# Patient Record
Sex: FEMALE | Race: WHITE | NOT HISPANIC OR LATINO | Employment: OTHER | URBAN - METROPOLITAN AREA
[De-identification: names, ages, dates, MRNs, and addresses within clinical notes are randomized per-mention and may not be internally consistent; named-entity substitution may affect disease eponyms.]

---

## 2019-10-21 ENCOUNTER — APPOINTMENT (OUTPATIENT)
Dept: WOMENS IMAGING | Facility: HOSPITAL | Age: 64
End: 2019-10-21

## 2019-10-21 PROCEDURE — 77067 SCR MAMMO BI INCL CAD: CPT | Performed by: RADIOLOGY

## 2019-10-21 PROCEDURE — 77063 BREAST TOMOSYNTHESIS BI: CPT | Performed by: RADIOLOGY

## 2022-03-14 ENCOUNTER — OFFICE VISIT (OUTPATIENT)
Dept: CARDIOLOGY | Facility: CLINIC | Age: 67
End: 2022-03-14

## 2022-03-14 VITALS
HEART RATE: 76 BPM | HEIGHT: 66 IN | SYSTOLIC BLOOD PRESSURE: 126 MMHG | DIASTOLIC BLOOD PRESSURE: 72 MMHG | WEIGHT: 120.8 LBS | BODY MASS INDEX: 19.41 KG/M2

## 2022-03-14 DIAGNOSIS — J44.9 COPD WITH ASTHMA: ICD-10-CM

## 2022-03-14 DIAGNOSIS — I25.6 SILENT MYOCARDIAL ISCHEMIA: ICD-10-CM

## 2022-03-14 DIAGNOSIS — F17.200 SMOKING: ICD-10-CM

## 2022-03-14 DIAGNOSIS — R06.02 SHORTNESS OF BREATH: ICD-10-CM

## 2022-03-14 DIAGNOSIS — R94.31 ABNORMAL EKG: ICD-10-CM

## 2022-03-14 DIAGNOSIS — R00.2 PALPITATIONS: Primary | ICD-10-CM

## 2022-03-14 PROBLEM — IMO0001 SMOKING: Status: ACTIVE | Noted: 2022-03-14

## 2022-03-14 PROBLEM — J44.89 COPD WITH ASTHMA: Status: ACTIVE | Noted: 2022-03-14

## 2022-03-14 PROCEDURE — 99204 OFFICE O/P NEW MOD 45 MIN: CPT | Performed by: INTERNAL MEDICINE

## 2022-03-14 PROCEDURE — 93000 ELECTROCARDIOGRAM COMPLETE: CPT | Performed by: INTERNAL MEDICINE

## 2022-03-14 RX ORDER — TRIAMTERENE AND HYDROCHLOROTHIAZIDE 75; 50 MG/1; MG/1
1 TABLET ORAL DAILY
COMMUNITY
End: 2022-11-09 | Stop reason: SDUPTHER

## 2022-03-14 RX ORDER — ALBUTEROL SULFATE 90 UG/1
2 AEROSOL, METERED RESPIRATORY (INHALATION) EVERY 6 HOURS PRN
COMMUNITY

## 2022-03-14 RX ORDER — MONTELUKAST SODIUM 10 MG/1
10 TABLET ORAL DAILY
COMMUNITY

## 2022-03-14 RX ORDER — EPINEPHRINE 0.3 MG/.3ML
0.3 INJECTION SUBCUTANEOUS ONCE
COMMUNITY

## 2022-03-14 RX ORDER — DICYCLOMINE HCL 20 MG
20 TABLET ORAL 2 TIMES DAILY PRN
COMMUNITY

## 2022-03-14 RX ORDER — ASPIRIN 81 MG/1
81 TABLET ORAL DAILY
Qty: 30 TABLET | Refills: 6 | Status: SHIPPED | OUTPATIENT
Start: 2022-03-14 | End: 2022-10-24

## 2022-03-14 RX ORDER — BUSPIRONE HYDROCHLORIDE 15 MG/1
15 TABLET ORAL 3 TIMES DAILY
COMMUNITY

## 2022-03-14 RX ORDER — CHOLECALCIFEROL (VITAMIN D3) 1250 MCG
50000 CAPSULE ORAL
COMMUNITY

## 2022-03-14 RX ORDER — SERTRALINE HYDROCHLORIDE 100 MG/1
100 TABLET, FILM COATED ORAL DAILY
COMMUNITY

## 2022-03-14 RX ORDER — NAPROXEN 500 MG/1
500 TABLET ORAL 2 TIMES DAILY WITH MEALS
COMMUNITY

## 2022-03-14 NOTE — PROGRESS NOTES
Chief Complaint   Patient presents with   • Establish Care     Referred per PCP for abnormal EKG .Patient says she saw you around 20+ years ago, will  see if we can get those records.   • Shortness of Breath     Reports she had been using Albuterol inhalers multiple times daily and feels it made her SOA worse, she has since cut back the amount she uses and SOA has improved .   • Palpitations     Reports  she feels palpitations frequently, with excessive inhaler use it was continually . But has improved some since cutting back on inhaler. She still notices them daily        CARDIAC COMPLAINTS  dyspnea and palpitations      Subjective   Krissy Wesley is a 66 y.o. female came in today for her initial cardiac evaluation.  She was seen here almost 20 years ago for chest pain and stress test done at that time showed increased chronotropic response.  She was put on low-dose of beta-blockers.  She apparently took it for few months and decided to quit taking it.  She was seen at your office recently for shortness of breath.  She has been having increasing shortness of breath and she apparently saw pulmonologist who gave her a prescription for albuterol inhalers and told her that she can use it as many times as she can wants.  When she started having increasing shortness of breath she started using the albuterol inhalers almost 8-10 times a day.  During that time she started noticing jittery feeling where she felt the heart was racing and she also noticed that the shortness of breath got worse.  After talking with you she cut down the amount of inhalers and then the symptoms but slowly getting better.  She did have an EKG done at your office visit which was reported as abnormal.  She is now referred for further evaluation.  She denies having any chest pain at this time.  She does continues to have shortness of breath and palpitation does little better than before.  She had some labs done which showed normal blood count,  normal WBC, normal blood sugar and electrolytes.  Her cholesterol is 159 with the LDL of 82 and triglyceride of 114.  Her TSH came back as normal.  She unfortunately still smokes about a pack a day.  She drinks at least 2 pots of coffee a day and also multiple Pepsi.  Her mother  with cancer and she did have hypertension.  Her father  of heart attack.    Past Surgical History:   Procedure Laterality Date   • CARDIOVASCULAR STRESS TEST  02/15/1999    R.Stress- 7 Min. 10.2 METS. 91% THR. BP- 136/92. Negative       Current Outpatient Medications   Medication Sig Dispense Refill   • albuterol sulfate  (90 Base) MCG/ACT inhaler Inhale 2 puffs Every 6 (Six) Hours As Needed for Wheezing.     • busPIRone (BUSPAR) 15 MG tablet Take 15 mg by mouth 3 (Three) Times a Day.     • Cholecalciferol (Vitamin D3) 1.25 MG (97651 UT) capsule Take 50,000 Units by mouth Every 7 (Seven) Days.     • dicyclomine (BENTYL) 20 MG tablet Take 20 mg by mouth 2 (Two) Times a Day As Needed.     • EPINEPHrine (EPIPEN) 0.3 MG/0.3ML solution auto-injector injection 0.3 mg 1 (One) Time.     • FOSINOPRIL SODIUM PO Take 10 mg by mouth Daily.     • montelukast (SINGULAIR) 10 MG tablet Take 10 mg by mouth Daily.     • naproxen (NAPROSYN) 500 MG tablet Take 500 mg by mouth 2 (Two) Times a Day With Meals.     • sertraline (ZOLOFT) 100 MG tablet Take 100 mg by mouth Daily. Takes 2 tablets at HS     • tiotropium (SPIRIVA) 18 MCG per inhalation capsule Place 1 capsule into inhaler and inhale Daily.     • triamterene-hydrochlorothiazide (MAXZIDE) 75-50 MG per tablet Take 1 tablet by mouth Daily.     • aspirin (aspirin) 81 MG EC tablet Take 1 tablet by mouth Daily. 30 tablet 6     No current facility-administered medications for this visit.           ALLERGIES:  Amoxicillin, Azithromycin, Bee venom, and Sulfa antibiotics    Past Medical History:   Diagnosis Date   • Acid reflux    • Asthma    • COPD (chronic obstructive pulmonary disease) ([facility])   "  • Hypertension    • IBS (irritable bowel syndrome)        Social History     Tobacco Use   Smoking Status Current Every Day Smoker   • Packs/day: 1.00   • Years: 40.00   • Pack years: 40.00   • Types: Cigarettes   Smokeless Tobacco Not on file          Family History   Problem Relation Age of Onset   • Hypertension Mother    • Cancer Mother    • Heart attack Father    • Hypertension Father        Review of Systems   Constitutional: Positive for malaise/fatigue. Negative for decreased appetite.   HENT: Negative for congestion and sore throat.    Eyes: Negative for blurred vision.   Cardiovascular: Positive for dyspnea on exertion and palpitations. Negative for chest pain.   Respiratory: Positive for shortness of breath. Negative for snoring.    Endocrine: Negative for cold intolerance and heat intolerance.   Hematologic/Lymphatic: Negative for adenopathy. Does not bruise/bleed easily.   Skin: Negative for itching, nail changes and skin cancer.   Musculoskeletal: Negative for arthritis and myalgias.   Gastrointestinal: Negative for abdominal pain, dysphagia and heartburn.   Genitourinary: Negative for bladder incontinence and frequency.   Neurological: Negative for dizziness, light-headedness, seizures and vertigo.   Psychiatric/Behavioral: Negative for altered mental status.   Allergic/Immunologic: Negative for environmental allergies and hives.       Diabetes- No  Thyroid- normal    Objective     /72 (BP Location: Left arm)   Pulse 76   Ht 167.6 cm (66\")   Wt 54.8 kg (120 lb 12.8 oz)   BMI 19.50 kg/m²     Vitals and nursing note reviewed.   Constitutional:       Appearance: Healthy appearance. Not in distress.   Eyes:      Conjunctiva/sclera: Conjunctivae normal.      Pupils: Pupils are equal, round, and reactive to light.   HENT:      Head: Normocephalic.   Pulmonary:      Effort: Pulmonary effort is normal.      Breath sounds: Normal breath sounds.   Cardiovascular:      PMI at left midclavicular line. " Normal rate. Regular rhythm.      Murmurs: There is a systolic murmur.   Abdominal:      General: Bowel sounds are normal.      Palpations: Abdomen is soft.   Musculoskeletal: Normal range of motion.      Cervical back: Normal range of motion and neck supple. Skin:     General: Skin is warm and dry.   Neurological:      Mental Status: Alert, oriented to person, place, and time and oriented to person, place and time.           ECG 12 Lead    Date/Time: 3/14/2022 1:05 PM  Performed by: Moises Billingsley MD  Authorized by: Moises Billingsley MD   Comparison: compared with previous ECG from 3/1/2022  Similar to previous ECG  Rhythm: sinus rhythm  Rate: normal  Q waves: V1, V2, V3 and V4    QRS axis: normal  Other findings: T wave abnormality    Clinical impression: abnormal EKG              Assessment/Plan   Patient's Body mass index is 19.5 kg/m². indicating that she is within normal range (BMI 18.5-24.9). No BMI management plan needed..     Diagnoses and all orders for this visit:    1. Palpitations (Primary)  -     Stress Test With Myocardial Perfusion One Day; Future  -     Comprehensive Metabolic Panel; Future  -     TSH; Future    2. Shortness of breath  -     Adult Transthoracic Echo Complete W/ Cont if Necessary Per Protocol; Future    3. Abnormal EKG  -     Stress Test With Myocardial Perfusion One Day; Future  -     aspirin (aspirin) 81 MG EC tablet; Take 1 tablet by mouth Daily.  Dispense: 30 tablet; Refill: 6    4. COPD with asthma (HCC)  -     CBC & Differential; Future    5. Smoking    6. Silent myocardial ischemia  -     Stress Test With Myocardial Perfusion One Day; Future  -     Lipid Panel; Future    At baseline her heart rate is stable.  Her blood pressure is normal.  Her EKG showed sinus rhythm with Q waves in the anterior leads and tall T waves in the inferolateral leads.  Her clinical examination reveals a BMI of 20.  She does have a slightly loud second heart sound and a short systolic murmur  at the mitral area.    Regarding the palpitation, it could be from the sinus tachycardia but need to rule out other causes also.  Her thyroid function test seems to be within normal limits last year.  Need to recheck it to make sure it is within normal limit.  I also advised her to check her electrolytes including magnesium level.  I scheduled her to undergo a stress test to evaluate her functional status, chronotropic response, blood pressure response and also rule out any stress-induced arrhythmia as well as ischemia causing some of the symptoms.  Regarding the shortness of breath, it is most likely from her smoking and asthma    But need to rule out cardiac.  I scheduled her to undergo an echocardiogram to evaluate for LVH, LV function, valvular structures as well as the PA pressure    Regarding the abnormal EKG, she does have some Q waves and abnormal T waves.  This all could be from the increased caffeine intake but I cannot rule out cardiac.  At this time advised her to be on aspirin at 81 mg once a day    Regarding her COPD and smoking, I talked to her about smoking cessation.  I explained to her the increased risk but at this time I do not think she is planning to quit smoking.    Regarding her caffeine intake, I talked to her about cutting down on the amount of caffeine she takes now    Regarding the possibility of silent ischemia was discussed with her at this time will review the stress test for any ischemia and also advised her to recheck her lipid panel    Based on the results, further recommendations will be made.               Electronically signed by Moises Billingsley MD March 14, 2022 12:54 EDT

## 2022-03-14 NOTE — PROGRESS NOTES
Krissy Wesley  reports that she has been smoking cigarettes. She has a 40.00 pack-year smoking history. She does not have any smokeless tobacco history on file.. I have educated her on the risk of diseases from using tobacco products such as cancer, COPD and heart disease.     I advised her to quit and she is not willing to quit.    I spent 3  minutes counseling the patient.

## 2022-04-06 ENCOUNTER — HOSPITAL ENCOUNTER (OUTPATIENT)
Dept: CARDIOLOGY | Facility: HOSPITAL | Age: 67
Discharge: HOME | End: 2022-04-06

## 2022-04-06 DIAGNOSIS — I25.6 SILENT MYOCARDIAL ISCHEMIA: ICD-10-CM

## 2022-04-06 DIAGNOSIS — R94.31 ABNORMAL EKG: ICD-10-CM

## 2022-04-06 DIAGNOSIS — R06.02 SHORTNESS OF BREATH: ICD-10-CM

## 2022-04-06 DIAGNOSIS — R00.2 PALPITATIONS: ICD-10-CM

## 2022-04-06 PROCEDURE — A9500 TC99M SESTAMIBI: HCPCS | Performed by: INTERNAL MEDICINE

## 2022-04-06 PROCEDURE — 93017 CV STRESS TEST TRACING ONLY: CPT

## 2022-04-06 PROCEDURE — 93018 CV STRESS TEST I&R ONLY: CPT | Performed by: INTERNAL MEDICINE

## 2022-04-06 PROCEDURE — 0 TECHNETIUM SESTAMIBI: Performed by: INTERNAL MEDICINE

## 2022-04-06 PROCEDURE — 93306 TTE W/DOPPLER COMPLETE: CPT | Performed by: INTERNAL MEDICINE

## 2022-04-06 PROCEDURE — 78452 HT MUSCLE IMAGE SPECT MULT: CPT | Performed by: INTERNAL MEDICINE

## 2022-04-06 PROCEDURE — 93306 TTE W/DOPPLER COMPLETE: CPT

## 2022-04-06 PROCEDURE — 78452 HT MUSCLE IMAGE SPECT MULT: CPT

## 2022-04-06 RX ADMIN — TECHNETIUM TC 99M SESTAMIBI 1 DOSE: 1 INJECTION INTRAVENOUS at 08:41

## 2022-04-06 RX ADMIN — TECHNETIUM TC 99M SESTAMIBI 1 DOSE: 1 INJECTION INTRAVENOUS at 11:05

## 2022-04-09 LAB
BH CV ECHO MEAS - ACS: 2.17 CM
BH CV ECHO MEAS - AO MAX PG: 7.7 MMHG
BH CV ECHO MEAS - AO MEAN PG: 4 MMHG
BH CV ECHO MEAS - AO ROOT DIAM: 3.1 CM
BH CV ECHO MEAS - AO V2 MAX: 138.5 CM/SEC
BH CV ECHO MEAS - AO V2 VTI: 31.7 CM
BH CV ECHO MEAS - EDV(CUBED): 54.9 ML
BH CV ECHO MEAS - EDV(MOD-SP4): 50.4 ML
BH CV ECHO MEAS - EF(MOD-SP4): 53.8 %
BH CV ECHO MEAS - EF_3D-VOL: 59 %
BH CV ECHO MEAS - ESV(CUBED): 21.3 ML
BH CV ECHO MEAS - ESV(MOD-SP4): 23.3 ML
BH CV ECHO MEAS - FS: 27.1 %
BH CV ECHO MEAS - IVS/LVPW: 0.8 CM
BH CV ECHO MEAS - IVSD: 1.05 CM
BH CV ECHO MEAS - LA DIMENSION: 3.3 CM
BH CV ECHO MEAS - LAT PEAK E' VEL: 7.3 CM/SEC
BH CV ECHO MEAS - LV DIASTOLIC VOL/BSA (35-75): 31.3 CM2
BH CV ECHO MEAS - LV MASS(C)D: 150.4 GRAMS
BH CV ECHO MEAS - LV SYSTOLIC VOL/BSA (12-30): 14.5 CM2
BH CV ECHO MEAS - LVIDD: 3.8 CM
BH CV ECHO MEAS - LVIDS: 2.8 CM
BH CV ECHO MEAS - LVOT AREA: 3 CM2
BH CV ECHO MEAS - LVOT DIAM: 1.96 CM
BH CV ECHO MEAS - LVPWD: 1.32 CM
BH CV ECHO MEAS - MED PEAK E' VEL: 5.8 CM/SEC
BH CV ECHO MEAS - MV A MAX VEL: 85.3 CM/SEC
BH CV ECHO MEAS - MV DEC SLOPE: 433.4 CM/SEC2
BH CV ECHO MEAS - MV E MAX VEL: 80.1 CM/SEC
BH CV ECHO MEAS - MV E/A: 0.94
BH CV ECHO MEAS - RVDD: 2.6 CM
BH CV ECHO MEAS - SI(MOD-SP4): 16.8 ML/M2
BH CV ECHO MEAS - SV(MOD-SP4): 27.1 ML
BH CV ECHO MEASUREMENTS AVERAGE E/E' RATIO: 12.23
BH CV REST NUCLEAR ISOTOPE DOSE: 10 MCI
BH CV STRESS NUCLEAR ISOTOPE DOSE: 30 MCI
BH CV STRESS RECOVERY BP: NORMAL MMHG
BH CV STRESS RECOVERY HR: 87 BPM
LEFT ATRIUM VOLUME INDEX: 31.9 ML/M2
LV EF NUC BP: 81 %
MAXIMAL PREDICTED HEART RATE: 156 BPM
MAXIMAL PREDICTED HEART RATE: 156 BPM
PERCENT MAX PREDICTED HR: 75 %
STRESS BASELINE BP: NORMAL MMHG
STRESS BASELINE HR: 80 BPM
STRESS PERCENT HR: 88 %
STRESS POST ESTIMATED WORKLOAD: 4.6 METS
STRESS POST EXERCISE DUR MIN: 3 MIN
STRESS POST EXERCISE DUR SEC: 49 SEC
STRESS POST PEAK BP: NORMAL MMHG
STRESS POST PEAK HR: 117 BPM
STRESS TARGET HR: 133 BPM
STRESS TARGET HR: 133 BPM

## 2022-04-12 NOTE — PROGRESS NOTES
Pt aware of results and recommendations to add Cozaar 50 mg daily. Advised to continue to monitor vitals and call with any problems. She said her PCP had acutally decreased on of her BP meds ( possibly the fosinopril to 1/2 tab daily) a couple weeks ago due to her BP being low.  Since then her BP has been elevated. She is going to add the Cozaar and see how it does. Advised to call if she has any problems.

## 2022-04-12 NOTE — TELEPHONE ENCOUNTER
Pt aware there is no need to change to the Cozaar since PCP had decreased the fosinopril , we will just  increase the fosinopril to 20 mg daily. Understanding voiced. She would like script sent to [facility]. Aware to continue to monitor vitals and call with any problems.

## 2022-04-13 RX ORDER — FOSINOPRIL SODIUM 20 MG/1
20 TABLET ORAL DAILY
Qty: 90 TABLET | Refills: 3 | Status: SHIPPED | OUTPATIENT
Start: 2022-04-13 | End: 2022-05-25

## 2022-04-14 ENCOUNTER — APPOINTMENT (OUTPATIENT)
Dept: CARDIOLOGY | Facility: HOSPITAL | Age: 67
End: 2022-04-14

## 2022-04-25 ENCOUNTER — TELEPHONE (OUTPATIENT)
Dept: CARDIOLOGY | Facility: CLINIC | Age: 67
End: 2022-04-25

## 2022-04-25 RX ORDER — METOPROLOL SUCCINATE 50 MG/1
50 TABLET, EXTENDED RELEASE ORAL EVERY MORNING
Qty: 90 TABLET | Refills: 3 | Status: SHIPPED | OUTPATIENT
Start: 2022-04-25 | End: 2022-06-07 | Stop reason: ALTCHOICE

## 2022-04-25 NOTE — TELEPHONE ENCOUNTER
Patient aware to decrease fosinopril to 10 mg daily and add Toprol XL 50 mg every morning.  Script sent to [facility].  Patient will monitor vitals and call office if not improving.

## 2022-04-25 NOTE — TELEPHONE ENCOUNTER
Patient called, she is concerned her heart rate is running too fast.  The last few days BP when she wakes up before taking fosinopril 20 mg QAM.  /89 HR 84  /77 HR 84  /91 HR 79  Then she takes her vitals late afternoon.  /66   /63   /75 HR 99  She does report palpitations. She feels heart pounding most of day.    She only takes her Maxzide PRN.  3/17/2022 TSH normal.

## 2022-05-19 NOTE — TELEPHONE ENCOUNTER
Pt called, BP has been elevated. Spoke at length with patient. BP had been elevated:    /99 pulse 67        164/90           68  Reports only rarely taking the Maxzide due to it dropping the BP to much. She is going to try taking fosinopril 20 mg 1/2 tab daily, metoprolol succ 50 mg daily and 1/2 tab of the Maxzide 75/50 mg daily to see if BP is better controlled. Will call with progress report.

## 2022-05-25 RX ORDER — LISINOPRIL 5 MG/1
5 TABLET ORAL DAILY
Qty: 90 TABLET | Refills: 3 | Status: SHIPPED | OUTPATIENT
Start: 2022-05-25 | End: 2022-11-09 | Stop reason: SDUPTHER

## 2022-05-25 NOTE — TELEPHONE ENCOUNTER
Pt aware to decrease Lisinopril to 5 mg daily, continue to monitor vitals and call if still having problems.

## 2022-05-25 NOTE — TELEPHONE ENCOUNTER
"Pt called back and left message with BP and pulse readings  20th    157/92 p 77      21st    150/86 p 71     22nd   112/69 p 89    23rd     99/47 p 86    24th     91/51 p  83      Current meds include:    Fosinopril 20 mg 1/2 tab daily  Metoprolol succ 50 mg daily  Maxzide 75/50 mg 1/2 tab daily    Pt does not want to stop the Maxzide. Reports she has taken for years and she has edema if she doesn't take it. \" I used to take 10 mg of the fosinopril and the maxzide and it controlled it for years, I just don't know what has happened.\"   "

## 2022-05-26 ENCOUNTER — TELEPHONE (OUTPATIENT)
Dept: CARDIOLOGY | Facility: CLINIC | Age: 67
End: 2022-05-26

## 2022-05-26 NOTE — TELEPHONE ENCOUNTER
Patient called to clarify that she was supposed to change from fosinopril to lisinopril 5 mg daily. Patient was made aware per Dr. Billingsley that she was to change.

## 2022-06-07 ENCOUNTER — TELEPHONE (OUTPATIENT)
Dept: CARDIOLOGY | Facility: CLINIC | Age: 67
End: 2022-06-07

## 2022-06-07 RX ORDER — DILTIAZEM HYDROCHLORIDE 180 MG/1
180 CAPSULE, COATED, EXTENDED RELEASE ORAL DAILY
Qty: 90 CAPSULE | Refills: 3 | Status: SHIPPED | OUTPATIENT
Start: 2022-06-07 | End: 2022-11-09 | Stop reason: SDUPTHER

## 2022-06-07 NOTE — TELEPHONE ENCOUNTER
Patient aware of recommendations to stop Toprol and start  Cardizem  mg daily.  Script sent to [facility].

## 2022-06-07 NOTE — TELEPHONE ENCOUNTER
Patient reports she is having a lot of SOA since starting the Toprol XL 50 mg daily.  She states even walking from room to room she develops SOA.   She has asthma and COPD and she feels like the Toprol has made her breathing much worse.   She tried to decrease the Toprol to 1/2 tab yesterday and HR went to 103.  She said BP is doing good. 114/67, 130/74, 137/84, 107/67    Also on:  Lisinopril 5 mg daily  Maxzide 75/50 mg 1/2 tab daily

## 2022-06-17 ENCOUNTER — TELEPHONE (OUTPATIENT)
Dept: CARDIOLOGY | Facility: CLINIC | Age: 67
End: 2022-06-17

## 2022-06-17 NOTE — TELEPHONE ENCOUNTER
Patient called stating that since starting diltiazem  mg daily her shortness of breath is better, but HR has been running around 92-97. She states that BP has been running around 139/85, 147/89, this morning it was 133/83 HR 82. Patient states that she stopped taking Maxzide 75-50 mg daily this week because she wasn't sure if she needed to take together. Patient is still also taking lisinopril. She is asking what recommendations you have?

## 2022-06-20 NOTE — TELEPHONE ENCOUNTER
Pt made aware, voiced understanding.  She has only been drinking Coffee and Pepsi.  Pt is going to drink only water over the new few days also and see what her HR does with that.

## 2022-10-24 DIAGNOSIS — R94.31 ABNORMAL EKG: ICD-10-CM

## 2022-10-24 RX ORDER — ASPIRIN 81 MG/1
TABLET ORAL
Qty: 30 TABLET | Refills: 6 | Status: SHIPPED | OUTPATIENT
Start: 2022-10-24

## 2022-11-09 ENCOUNTER — OFFICE VISIT (OUTPATIENT)
Dept: CARDIOLOGY | Facility: CLINIC | Age: 67
End: 2022-11-09

## 2022-11-09 VITALS
HEIGHT: 66 IN | HEART RATE: 82 BPM | WEIGHT: 126.2 LBS | SYSTOLIC BLOOD PRESSURE: 126 MMHG | DIASTOLIC BLOOD PRESSURE: 84 MMHG | BODY MASS INDEX: 20.28 KG/M2

## 2022-11-09 DIAGNOSIS — R06.02 SHORTNESS OF BREATH: ICD-10-CM

## 2022-11-09 DIAGNOSIS — R00.2 PALPITATIONS: Primary | ICD-10-CM

## 2022-11-09 DIAGNOSIS — I10 PRIMARY HYPERTENSION: ICD-10-CM

## 2022-11-09 PROCEDURE — 99213 OFFICE O/P EST LOW 20 MIN: CPT | Performed by: NURSE PRACTITIONER

## 2022-11-09 RX ORDER — LISINOPRIL 5 MG/1
5 TABLET ORAL DAILY
Qty: 90 TABLET | Refills: 3 | Status: SHIPPED | OUTPATIENT
Start: 2022-11-09

## 2022-11-09 RX ORDER — DILTIAZEM HYDROCHLORIDE 180 MG/1
180 CAPSULE, COATED, EXTENDED RELEASE ORAL DAILY
Qty: 90 CAPSULE | Refills: 3 | Status: SHIPPED | OUTPATIENT
Start: 2022-11-09

## 2022-11-09 RX ORDER — TRIAMTERENE AND HYDROCHLOROTHIAZIDE 75; 50 MG/1; MG/1
1 TABLET ORAL DAILY
Qty: 90 TABLET | Refills: 2 | Status: SHIPPED | OUTPATIENT
Start: 2022-11-09

## 2022-11-09 NOTE — PROGRESS NOTES
Chief Complaint   Patient presents with   • Follow-up     Pt is here for cardiac follow up.  Pt states she does have SOB, but relates it to COPD and home renovations.  She denies CP, dizziness or palpitations.  Pt does take a daily ASA.   • Med Refill     Pt request 90 day refills to be sent to [facility].  Medications were verbalized by the pt.     • Lab Work     Pt states their last labs were about 8 months ago with her PCP.         Cardiac Complaints  dyspnea      Subjective   Krissy Wesley is a 67 y.o. female with palpitations, HTN, tobacco abuse, and anxiety. She was referred back to our office in 2022 after not being seen for 20 years for SOA and palpitations. She was urged to undergo cardiac workup to rule out cardiac concerns. Stress was negative for ischemia, but HTN response noted, cozaar added, later changed to lisinopril. Echo showed EF stable with mild MR.  She had called in with tachycardia, BB added, later changed to cardizem due to fatigue.    She comes today for follow up and admits to continued SOA, which she relates to COPD and tobacco abuse. No CP, dizziness, palpitations, or syncope reported. Labs done 8 months ago with PCP, no current available. Refills requested for 90 day supply.      Cardiac History  Past Surgical History:   Procedure Laterality Date   • CARDIOVASCULAR STRESS TEST  02/15/1999    R.Stress- 7 Min. 10.2 METS. 91% THR. BP- 136/92. Negative   • CARDIOVASCULAR STRESS TEST  04/06/2022    3 Min.49 Sec. 4.6 METS. 75% THR. BP- 179/70. EF > 70%. Negative   • ECHO - CONVERTED  04/06/2022    EF 65%. Mild MR & AI       Current Outpatient Medications   Medication Sig Dispense Refill   • albuterol sulfate  (90 Base) MCG/ACT inhaler Inhale 2 puffs Every 6 (Six) Hours As Needed for Wheezing.     • Aspirin Adult Low Strength 81 MG EC tablet TAKE ONE TABLET BY MOUTH DAILY 30 tablet 6   • busPIRone (BUSPAR) 15 MG tablet Take 15 mg by mouth 3 (Three) Times a Day.     • Cholecalciferol  (Vitamin D3) 1.25 MG (62693 UT) capsule Take 50,000 Units by mouth Every 7 (Seven) Days.     • dicyclomine (BENTYL) 20 MG tablet Take 20 mg by mouth 2 (Two) Times a Day As Needed.     • dilTIAZem CD (Cardizem CD) 180 MG 24 hr capsule Take 1 capsule by mouth Daily. 90 capsule 3   • EPINEPHrine (EPIPEN) 0.3 MG/0.3ML solution auto-injector injection 0.3 mg 1 (One) Time.     • lisinopril (PRINIVIL,ZESTRIL) 5 MG tablet Take 1 tablet by mouth Daily. 90 tablet 3   • montelukast (SINGULAIR) 10 MG tablet Take 10 mg by mouth Daily.     • naproxen (NAPROSYN) 500 MG tablet Take 500 mg by mouth 2 (Two) Times a Day With Meals.     • sertraline (ZOLOFT) 100 MG tablet Take 100 mg by mouth Daily. Takes 2 tablets at HS     • tiotropium (SPIRIVA) 18 MCG per inhalation capsule Place 1 capsule into inhaler and inhale Daily.     • triamterene-hydrochlorothiazide (MAXZIDE) 75-50 MG per tablet Take 1 tablet by mouth Daily. 90 tablet 2     No current facility-administered medications for this visit.       Amoxicillin, Azithromycin, Bee venom, and Sulfa antibiotics    Past Medical History:   Diagnosis Date   • Acid reflux    • Asthma    • COPD (chronic obstructive pulmonary disease) (HCC)    • Hypertension    • IBS (irritable bowel syndrome)        Social History     Socioeconomic History   • Marital status: Unknown   Tobacco Use   • Smoking status: Every Day     Packs/day: 1.00     Years: 40.00     Pack years: 40.00     Types: Cigarettes   Vaping Use   • Vaping Use: Never used   Substance and Sexual Activity   • Alcohol use: Not Currently   • Drug use: Never       Family History   Problem Relation Age of Onset   • Hypertension Mother    • Cancer Mother    • Heart attack Father    • Hypertension Father        Review of Systems   Constitutional: Negative for malaise/fatigue and night sweats.   Cardiovascular: Positive for dyspnea on exertion. Negative for chest pain, claudication, irregular heartbeat, leg swelling, near-syncope, orthopnea,  "palpitations and syncope.   Respiratory: Positive for shortness of breath. Negative for cough and wheezing.    Musculoskeletal: Positive for stiffness. Negative for back pain and joint pain.   Gastrointestinal: Negative for anorexia, heartburn, nausea and vomiting.   Genitourinary: Negative for dysuria, hematuria, hesitancy and nocturia.   Neurological: Negative for dizziness, headaches and light-headedness.   Psychiatric/Behavioral: Negative for depression and memory loss. The patient is not nervous/anxious.            Objective     /84 (BP Location: Left arm, Patient Position: Sitting)   Pulse 82   Ht 167.6 cm (66\")   Wt 57.2 kg (126 lb 3.2 oz)   BMI 20.37 kg/m²     Constitutional:       Appearance: Not in distress.   Eyes:      Pupils: Pupils are equal, round, and reactive to light.   HENT:      Nose: Nose normal.   Pulmonary:      Effort: Pulmonary effort is normal.      Breath sounds: Normal breath sounds.   Cardiovascular:      PMI at left midclavicular line. Normal rate. Regular rhythm.      Murmurs: There is a systolic murmur.   Abdominal:      Palpations: Abdomen is soft.   Musculoskeletal: Normal range of motion.      Cervical back: Normal range of motion and neck supple. Skin:     General: Skin is warm and dry.   Neurological:      Mental Status: Alert and oriented to person, place and time.         Procedures         Diagnoses and all orders for this visit:    1. Palpitations (Primary)    2. Primary hypertension    3. Shortness of breath    Other orders  -     dilTIAZem CD (Cardizem CD) 180 MG 24 hr capsule; Take 1 capsule by mouth Daily.  Dispense: 90 capsule; Refill: 3  -     lisinopril (PRINIVIL,ZESTRIL) 5 MG tablet; Take 1 tablet by mouth Daily.  Dispense: 90 tablet; Refill: 3  -     triamterene-hydrochlorothiazide (MAXZIDE) 75-50 MG per tablet; Take 1 tablet by mouth Daily.  Dispense: 90 tablet; Refill: 2             Palpitations: On cardizem therapy. She states it has worked well vs BB " therapy. She was urged to limit caffeine intake and increase fluids.    HTN: BP is stable today. No changes to current lisinopril, maxzide, or cardizem therapy. Limited sodium intake urged.     Cardiac status: Stable today. On ASA without concerns. No bleed or bruise reported. Most recent workup showed no ischemia, LV dysfunction noted.     COPD: On inhaler therapy. She tolerates well. No worsening SOA noted.     Tobacco abuse: Cessation urged, she is not ready to quit at present.     Refills per request.     BMI noted at 20.37, good cardiac diet and walking regimen urged.     6 month follow up advised, or sooner if needed.       Problems Addressed this Visit        Cardiac and Vasculature    Palpitations - Primary       Pulmonary and Pneumonias    Shortness of breath   Other Visit Diagnoses     Primary hypertension        Relevant Medications    dilTIAZem CD (Cardizem CD) 180 MG 24 hr capsule    lisinopril (PRINIVIL,ZESTRIL) 5 MG tablet    triamterene-hydrochlorothiazide (MAXZIDE) 75-50 MG per tablet      Diagnoses       Codes Comments    Palpitations    -  Primary ICD-10-CM: R00.2  ICD-9-CM: 785.1     Primary hypertension     ICD-10-CM: I10  ICD-9-CM: 401.9     Shortness of breath     ICD-10-CM: R06.02  ICD-9-CM: 786.05           BMI is within normal parameters. No other follow-up for BMI required.                Electronically signed by MINO Lagunas November 9, 2022 11:41 EST

## 2022-11-09 NOTE — ACP (ADVANCE CARE PLANNING)
Advance Care Planning   ACP discussion was declined by the patient. Patient does not have an advance directive, declines further assistance.

## 2023-05-09 ENCOUNTER — OFFICE VISIT (OUTPATIENT)
Dept: CARDIOLOGY | Facility: CLINIC | Age: 68
End: 2023-05-09
Payer: MEDICARE

## 2023-05-09 VITALS
WEIGHT: 135.4 LBS | HEART RATE: 70 BPM | DIASTOLIC BLOOD PRESSURE: 84 MMHG | BODY MASS INDEX: 21.76 KG/M2 | HEIGHT: 66 IN | SYSTOLIC BLOOD PRESSURE: 150 MMHG

## 2023-05-09 DIAGNOSIS — R00.2 PALPITATIONS: Primary | ICD-10-CM

## 2023-05-09 DIAGNOSIS — J44.9 COPD WITH ASTHMA: ICD-10-CM

## 2023-05-09 DIAGNOSIS — I10 PRIMARY HYPERTENSION: ICD-10-CM

## 2023-05-09 DIAGNOSIS — Z87.891 FORMER SMOKER: ICD-10-CM

## 2023-05-09 PROCEDURE — 99214 OFFICE O/P EST MOD 30 MIN: CPT | Performed by: NURSE PRACTITIONER

## 2023-05-09 RX ORDER — TRIAMTERENE AND HYDROCHLOROTHIAZIDE 75; 50 MG/1; MG/1
1 TABLET ORAL DAILY
Qty: 90 TABLET | Refills: 2 | Status: SHIPPED | OUTPATIENT
Start: 2023-05-09

## 2023-05-09 RX ORDER — LISINOPRIL 5 MG/1
5 TABLET ORAL DAILY
Qty: 90 TABLET | Refills: 3 | Status: SHIPPED | OUTPATIENT
Start: 2023-05-09

## 2023-05-09 RX ORDER — DILTIAZEM HYDROCHLORIDE 180 MG/1
180 CAPSULE, COATED, EXTENDED RELEASE ORAL DAILY
Qty: 90 CAPSULE | Refills: 3 | Status: SHIPPED | OUTPATIENT
Start: 2023-05-09

## 2023-05-09 NOTE — PROGRESS NOTES
Chief Complaint   Patient presents with   • Follow-up     Pt is here for cardiac follow up.  Pt quit smoking on March 4th and is now exercising daily.  Pt denies CP, SOB, dizziness or palpitations.  Pt does take a daily ASA.   • Med Refill     Pt request 90 day refills to be sent to [facility].  Medications were verified by the pt.     •  Lab Work     Pt states their last labs were about 5 months ago with her PCP.         Cardiac Complaints  none      Subjective   Krissy Wesley is a 67 y.o. female with palpitations, HTN, tobacco abuse, and anxiety. She was referred back to our office in 2022 after not being seen for 20 years for SOA and palpitations. She was urged to undergo cardiac workup to rule out cardiac concerns. Stress was negative for ischemia, but HTN response noted, cozaar added, later changed to lisinopril. Echo showed EF stable with mild MR.  She had called in with tachycardia, BB added, later changed to cardizem due to fatigue.    She comes today for follow up and reports doing very well. She states she has quit smoking since March 4th after she was diagnosed with COPD and couldn't breath. She states her breath is so much better since she quit and oxygen improved. She is doing P90X daily without concerns. She is followed by Dr. Hanson. Labs with PCP, checked 5 months ago, no current available. Refills requested for 90 day supply.            Cardiac History  Past Surgical History:   Procedure Laterality Date   • CARDIOVASCULAR STRESS TEST  02/15/1999    R.Stress- 7 Min. 10.2 METS. 91% THR. BP- 136/92. Negative   • CARDIOVASCULAR STRESS TEST  04/06/2022    3 Min.49 Sec. 4.6 METS. 75% THR. BP- 179/70. EF > 70%. Negative   • ECHO - CONVERTED  04/06/2022    EF 65%. Mild MR & AI       Current Outpatient Medications   Medication Sig Dispense Refill   • albuterol sulfate  (90 Base) MCG/ACT inhaler Inhale 2 puffs Every 6 (Six) Hours As Needed for Wheezing.     • Aspirin Adult Low Strength 81 MG EC  tablet TAKE ONE TABLET BY MOUTH DAILY 30 tablet 6   • busPIRone (BUSPAR) 15 MG tablet Take 1 tablet by mouth 3 (Three) Times a Day.     • dilTIAZem CD (Cardizem CD) 180 MG 24 hr capsule Take 1 capsule by mouth Daily. 90 capsule 3   • EPINEPHrine (EPIPEN) 0.3 MG/0.3ML solution auto-injector injection 0.3 mg 1 (One) Time.     • lisinopril (PRINIVIL,ZESTRIL) 5 MG tablet Take 1 tablet by mouth Daily. 90 tablet 3   • montelukast (SINGULAIR) 10 MG tablet Take 1 tablet by mouth Daily.     • naproxen (NAPROSYN) 500 MG tablet Take 1 tablet by mouth 2 (Two) Times a Day With Meals.     • sertraline (ZOLOFT) 100 MG tablet Take 2 tablets by mouth Daily.     • tiotropium (SPIRIVA) 18 MCG per inhalation capsule Place 1 capsule into inhaler and inhale Daily.     • triamterene-hydrochlorothiazide (MAXZIDE) 75-50 MG per tablet Take 1 tablet by mouth Daily. 90 tablet 2     No current facility-administered medications for this visit.       Amoxicillin, Azithromycin, Bee venom, and Sulfa antibiotics    Past Medical History:   Diagnosis Date   • Acid reflux    • Asthma    • COPD (chronic obstructive pulmonary disease)    • Hypertension    • IBS (irritable bowel syndrome)        Social History     Socioeconomic History   • Marital status: Unknown   Tobacco Use   • Smoking status: Former     Packs/day: 1.00     Years: 40.00     Pack years: 40.00     Types: Cigarettes     Quit date: 3/4/2023     Years since quittin.1   Vaping Use   • Vaping Use: Never used   Substance and Sexual Activity   • Alcohol use: Never   • Drug use: Never       Family History   Problem Relation Age of Onset   • Hypertension Mother    • Cancer Mother    • Heart attack Father    • Hypertension Father        Review of Systems   Constitutional: Negative for malaise/fatigue and night sweats.   Cardiovascular: Negative for chest pain, claudication, dyspnea on exertion, irregular heartbeat, leg swelling, near-syncope, orthopnea, palpitations and syncope.   Respiratory:  "Negative for cough, shortness of breath and wheezing.    Musculoskeletal: Negative for back pain, joint pain and stiffness.   Gastrointestinal: Negative for anorexia, heartburn, nausea and vomiting.   Genitourinary: Negative for dysuria, hematuria, hesitancy and nocturia.   Neurological: Negative for dizziness, headaches and light-headedness.   Psychiatric/Behavioral: Negative for depression and memory loss. The patient is not nervous/anxious.            Objective     /84 (BP Location: Left arm, Patient Position: Sitting)   Pulse 70   Ht 167.6 cm (66\")   Wt 61.4 kg (135 lb 6.4 oz)   BMI 21.85 kg/m²     Constitutional:       Appearance: Not in distress.   Eyes:      Pupils: Pupils are equal, round, and reactive to light.   HENT:      Nose: Nose normal.   Pulmonary:      Effort: Pulmonary effort is normal.      Breath sounds: Normal breath sounds.   Cardiovascular:      PMI at left midclavicular line. Normal rate. Regular rhythm.      Murmurs: There is a systolic murmur.   Abdominal:      Palpations: Abdomen is soft.   Musculoskeletal: Normal range of motion.      Cervical back: Normal range of motion and neck supple. Skin:     General: Skin is warm and dry.   Neurological:      Mental Status: Alert and oriented to person, place and time.         Procedures         Diagnoses and all orders for this visit:    1. Palpitations (Primary)    2. Primary hypertension    3. COPD with asthma    4. Former smoker    Other orders  -     dilTIAZem CD (Cardizem CD) 180 MG 24 hr capsule; Take 1 capsule by mouth Daily.  Dispense: 90 capsule; Refill: 3  -     lisinopril (PRINIVIL,ZESTRIL) 5 MG tablet; Take 1 tablet by mouth Daily.  Dispense: 90 tablet; Refill: 3  -     triamterene-hydrochlorothiazide (MAXZIDE) 75-50 MG per tablet; Take 1 tablet by mouth Daily.  Dispense: 90 tablet; Refill: 2             Palpitations: On cardizem therapy. Palpitations are denied. She was urged to limit caffeine intake and increase " fluids.     HTN: BP is elevated today. Has not been taking maxzide daily, was urged to do so. Will continue lisinopril and cardizem at same.     Cardiac status: Stable today. On ASA without concerns. No bleed or bruise reported. Most recent workup showed no ischemia, or LV dysfunction.     COPD: On inhaler therapy. She tolerates well. No worsening SOA noted.      Tobacco abuse: Has quit smoking since March! Gained a new lease on life. She reports being very active without concerns.     Refills per request.      BMI noted at 21.85, good cardiac diet and walking regimen urged.      6 month follow up advised, or sooner if needed.            Problems Addressed this Visit        Cardiac and Vasculature    Palpitations - Primary       Pulmonary and Pneumonias    COPD with asthma   Other Visit Diagnoses     Primary hypertension        Relevant Medications    dilTIAZem CD (Cardizem CD) 180 MG 24 hr capsule    lisinopril (PRINIVIL,ZESTRIL) 5 MG tablet    triamterene-hydrochlorothiazide (MAXZIDE) 75-50 MG per tablet    Former smoker          Diagnoses       Codes Comments    Palpitations    -  Primary ICD-10-CM: R00.2  ICD-9-CM: 785.1     Primary hypertension     ICD-10-CM: I10  ICD-9-CM: 401.9     COPD with asthma     ICD-10-CM: J44.9  ICD-9-CM: 493.20     Former smoker     ICD-10-CM: Z87.891  ICD-9-CM: V15.82                 Electronically signed by MINO Lagunas May 9, 2023 11:45 EDT

## 2023-06-10 DIAGNOSIS — R94.31 ABNORMAL EKG: ICD-10-CM

## 2023-06-12 RX ORDER — ASPIRIN 81 MG/1
TABLET ORAL
Qty: 30 TABLET | Refills: 6 | Status: SHIPPED | OUTPATIENT
Start: 2023-06-12

## 2023-11-21 ENCOUNTER — OFFICE VISIT (OUTPATIENT)
Dept: CARDIOLOGY | Facility: CLINIC | Age: 68
End: 2023-11-21
Payer: MEDICARE

## 2023-11-21 VITALS
BODY MASS INDEX: 22.14 KG/M2 | DIASTOLIC BLOOD PRESSURE: 80 MMHG | HEART RATE: 78 BPM | SYSTOLIC BLOOD PRESSURE: 122 MMHG | WEIGHT: 137.8 LBS | HEIGHT: 66 IN

## 2023-11-21 DIAGNOSIS — R00.2 PALPITATIONS: Primary | ICD-10-CM

## 2023-11-21 DIAGNOSIS — Z87.891 FORMER SMOKER: ICD-10-CM

## 2023-11-21 DIAGNOSIS — I10 PRIMARY HYPERTENSION: ICD-10-CM

## 2023-11-21 PROCEDURE — 99213 OFFICE O/P EST LOW 20 MIN: CPT | Performed by: NURSE PRACTITIONER

## 2023-11-21 RX ORDER — TRIAMTERENE AND HYDROCHLOROTHIAZIDE 75; 50 MG/1; MG/1
1 TABLET ORAL DAILY
Qty: 90 TABLET | Refills: 2 | Status: SHIPPED | OUTPATIENT
Start: 2023-11-21

## 2023-11-21 RX ORDER — DILTIAZEM HYDROCHLORIDE 180 MG/1
180 CAPSULE, COATED, EXTENDED RELEASE ORAL DAILY
Qty: 90 CAPSULE | Refills: 3 | Status: SHIPPED | OUTPATIENT
Start: 2023-11-21

## 2023-11-21 RX ORDER — LISINOPRIL 5 MG/1
5 TABLET ORAL DAILY
Qty: 90 TABLET | Refills: 3 | Status: SHIPPED | OUTPATIENT
Start: 2023-11-21

## 2024-01-31 DIAGNOSIS — R94.31 ABNORMAL EKG: ICD-10-CM

## 2024-01-31 RX ORDER — ASPIRIN 81 MG/1
81 TABLET ORAL DAILY
Qty: 30 TABLET | Refills: 6 | Status: SHIPPED | OUTPATIENT
Start: 2024-01-31

## 2024-06-13 ENCOUNTER — OFFICE VISIT (OUTPATIENT)
Dept: CARDIOLOGY | Facility: CLINIC | Age: 69
End: 2024-06-13
Payer: MEDICARE

## 2024-06-13 VITALS
HEIGHT: 66 IN | DIASTOLIC BLOOD PRESSURE: 78 MMHG | BODY MASS INDEX: 21.83 KG/M2 | HEART RATE: 68 BPM | SYSTOLIC BLOOD PRESSURE: 116 MMHG | WEIGHT: 135.8 LBS

## 2024-06-13 DIAGNOSIS — R06.02 SHORTNESS OF BREATH: Primary | ICD-10-CM

## 2024-06-13 DIAGNOSIS — R00.2 PALPITATIONS: ICD-10-CM

## 2024-06-13 DIAGNOSIS — Z87.891 FORMER SMOKER: ICD-10-CM

## 2024-06-13 DIAGNOSIS — R01.1 HEART MURMUR: ICD-10-CM

## 2024-06-13 DIAGNOSIS — J44.89 COPD WITH ASTHMA: ICD-10-CM

## 2024-06-13 DIAGNOSIS — I10 PRIMARY HYPERTENSION: ICD-10-CM

## 2024-06-13 PROCEDURE — 99214 OFFICE O/P EST MOD 30 MIN: CPT | Performed by: NURSE PRACTITIONER

## 2024-06-13 RX ORDER — LISINOPRIL 5 MG/1
5 TABLET ORAL DAILY
Qty: 90 TABLET | Refills: 3 | Status: SHIPPED | OUTPATIENT
Start: 2024-06-13

## 2024-06-13 RX ORDER — DILTIAZEM HYDROCHLORIDE 180 MG/1
180 CAPSULE, COATED, EXTENDED RELEASE ORAL DAILY
Qty: 90 CAPSULE | Refills: 3 | Status: SHIPPED | OUTPATIENT
Start: 2024-06-13

## 2024-06-13 RX ORDER — OMEPRAZOLE 20 MG/1
20 CAPSULE, DELAYED RELEASE ORAL DAILY
COMMUNITY
Start: 2024-04-03

## 2024-06-13 RX ORDER — TRIAMTERENE AND HYDROCHLOROTHIAZIDE 75; 50 MG/1; MG/1
1 TABLET ORAL DAILY
Qty: 90 TABLET | Refills: 2 | Status: SHIPPED | OUTPATIENT
Start: 2024-06-13

## 2024-06-13 NOTE — PROGRESS NOTES
Chief Complaint   Patient presents with    Follow-up     Pt is here for cardiac follow up. Pt denies CP, palpations/dizziness. Pt does have SOB when active  Pt does take daily aspirin        Med Refill     Pt request [facility]  Medications were verified by pt.      lab work     Pt states their last labs were this week on Monday with PCP.         Cardiac Complaints  dyspnea      Subjective   Krissy Wesley is a 68 y.o. female with palpitations, HTN, and anxiety. She was referred back to our office in 2022 after not being seen for 20 years for SOA and palpitations. She was urged to undergo cardiac workup to rule out cardiac concerns. Stress was negative for ischemia, but HTN response noted, cozaar added, later changed to lisinopril. Echo showed EF stable with mild MR.  She had called in with tachycardia, BB added, later changed to cardizem due to fatigue.     She comes today for follow up and no new concerns are voiced. CP, palpitations, dizziness, and syncope denied.  SOA with activity reported. CT of the chest of 2024 which showed possible pericardial effusion, she does admit this has caused some anxiety. Labs with PCP, checked Monday. Refills requested for 90 day supply.          Cardiac History  Past Surgical History:   Procedure Laterality Date    CARDIOVASCULAR STRESS TEST  02/15/1999    R.Stress- 7 Min. 10.2 METS. 91% THR. BP- 136/92. Negative    CARDIOVASCULAR STRESS TEST  04/06/2022    3 Min.49 Sec. 4.6 METS. 75% THR. BP- 179/70. EF > 70%. Negative    ECHO - CONVERTED  04/06/2022    EF 65%. Mild MR & AI       Current Outpatient Medications   Medication Sig Dispense Refill    albuterol sulfate  (90 Base) MCG/ACT inhaler Inhale 2 puffs Every 6 (Six) Hours As Needed for Wheezing.      aspirin (Aspirin Adult Low Strength) 81 MG EC tablet Take 1 tablet by mouth Daily. 30 tablet 6    busPIRone (BUSPAR) 15 MG tablet Take 1 tablet by mouth 3 (Three) Times a Day.      dilTIAZem CD  (Cardizem CD) 180 MG 24 hr capsule Take 1 capsule by mouth Daily. 90 capsule 3    EPINEPHrine (EPIPEN) 0.3 MG/0.3ML solution auto-injector injection 0.3 mL 1 (One) Time.      lisinopril (PRINIVIL,ZESTRIL) 5 MG tablet Take 1 tablet by mouth Daily. 90 tablet 3    montelukast (SINGULAIR) 10 MG tablet Take 1 tablet by mouth Daily.      naproxen (NAPROSYN) 500 MG tablet Take 1 tablet by mouth 2 (Two) Times a Day With Meals.      omeprazole (priLOSEC) 20 MG capsule Take 1 capsule by mouth Daily.      sertraline (ZOLOFT) 100 MG tablet Take 2 tablets by mouth Daily.      tiotropium (SPIRIVA) 18 MCG per inhalation capsule Place 1 capsule into inhaler and inhale Daily.      triamterene-hydrochlorothiazide (MAXZIDE) 75-50 MG per tablet Take 1 tablet by mouth Daily. 90 tablet 2     No current facility-administered medications for this visit.       Amoxicillin, Azithromycin, Bee venom, Sulfa antibiotics, and Codeine    Past Medical History:   Diagnosis Date    Acid reflux     Asthma     COPD (chronic obstructive pulmonary disease)     Hypertension     IBS (irritable bowel syndrome)        Social History     Socioeconomic History    Marital status:    Tobacco Use    Smoking status: Former     Current packs/day: 0.00     Average packs/day: 1 pack/day for 15.0 years (15.0 ttl pk-yrs)     Types: Cigarettes     Start date: 3/4/2008     Quit date: 3/4/2023     Years since quittin.2    Tobacco comments:     Wuit smoking 2023   Vaping Use    Vaping status: Never Used   Substance and Sexual Activity    Alcohol use: Never    Drug use: Never    Sexual activity: Never       Family History   Problem Relation Age of Onset    Hypertension Mother     Cancer Mother     Heart attack Father     Hypertension Father        Review of Systems   Constitutional: Negative for malaise/fatigue and night sweats.   Cardiovascular:  Positive for dyspnea on exertion. Negative for chest pain, claudication, irregular heartbeat, leg swelling,  "near-syncope, orthopnea, palpitations and syncope.   Respiratory:  Positive for shortness of breath. Negative for cough and wheezing.    Musculoskeletal:  Positive for stiffness. Negative for back pain and joint pain.   Gastrointestinal:  Negative for anorexia, heartburn, nausea and vomiting.   Genitourinary:  Negative for dysuria, hematuria, hesitancy and nocturia.   Neurological:  Negative for dizziness, headaches and light-headedness.   Psychiatric/Behavioral:  Negative for depression and memory loss. The patient is not nervous/anxious.            Objective     /78 (BP Location: Left arm, Patient Position: Sitting)   Pulse 68   Ht 167.6 cm (65.98\")   Wt 61.6 kg (135 lb 12.8 oz)   LMP  (LMP Unknown)   BMI 21.93 kg/m²     Constitutional:       Appearance: Not in distress.   Eyes:      Pupils: Pupils are equal, round, and reactive to light.   HENT:      Nose: Nose normal.   Pulmonary:      Effort: Pulmonary effort is normal.      Breath sounds: Normal breath sounds.   Cardiovascular:      PMI at left midclavicular line. Normal rate. Regular rhythm.      Murmurs: There is a systolic murmur.   Abdominal:      Palpations: Abdomen is soft.   Musculoskeletal: Normal range of motion.      Cervical back: Normal range of motion and neck supple. Skin:     General: Skin is warm and dry.   Neurological:      Mental Status: Alert.         Procedures         Diagnoses and all orders for this visit:    1. Shortness of breath (Primary)  -     Adult Transthoracic Echo Complete W/ Cont if Necessary Per Protocol; Future    2. Heart murmur  -     Adult Transthoracic Echo Complete W/ Cont if Necessary Per Protocol; Future    3. Palpitations    4. COPD with asthma    5. Former smoker    Other orders  -     dilTIAZem CD (Cardizem CD) 180 MG 24 hr capsule; Take 1 capsule by mouth Daily.  Dispense: 90 capsule; Refill: 3  -     lisinopril (PRINIVIL,ZESTRIL) 5 MG tablet; Take 1 tablet by mouth Daily.  Dispense: 90 tablet; Refill: " 3  -     triamterene-hydrochlorothiazide (MAXZIDE) 75-50 MG per tablet; Take 1 tablet by mouth Daily.  Dispense: 90 tablet; Refill: 2             Palpitations: On cardizem therapy. Palpitations are denied. She was urged to limit caffeine intake and increase fluids.     HTN: BP remains stable, taking maxzide daily, will continue. Will continue lisinopril and cardizem at same.    SOA:  Noted today, she does have CT of the chest that showed small pericardial effusion, will repeat echo to assess for effusion.     Cardiac status: Stable today. On ASA without concerns. No bleed or bruise reported. Most recent workup showed no ischemia, or LV dysfunction.     COPD: On inhaler therapy. She tolerates well. No worsening SOA noted.      Tobacco abuse: Remains tobacco free!     Refills per request.      BMI noted at 21.93, good cardiac diet and walking regimen urged.      6 month follow up advised, or sooner if needed.          Problems Addressed this Visit          Cardiac and Vasculature    Palpitations       Pulmonary and Pneumonias    COPD with asthma    Shortness of breath - Primary    Relevant Orders    Adult Transthoracic Echo Complete W/ Cont if Necessary Per Protocol     Other Visit Diagnoses       Heart murmur        Relevant Orders    Adult Transthoracic Echo Complete W/ Cont if Necessary Per Protocol    Former smoker              Diagnoses         Codes Comments    Shortness of breath    -  Primary ICD-10-CM: R06.02  ICD-9-CM: 786.05     Heart murmur     ICD-10-CM: R01.1  ICD-9-CM: 785.2     Palpitations     ICD-10-CM: R00.2  ICD-9-CM: 785.1     COPD with asthma     ICD-10-CM: J44.89  ICD-9-CM: 493.20     Former smoker     ICD-10-CM: Z87.891  ICD-9-CM: V15.82             BMI is within normal parameters. No other follow-up for BMI required.              Electronically signed by MINO Lagunas June 13, 2024 11:44 EDT

## 2024-06-19 ENCOUNTER — HOSPITAL ENCOUNTER (OUTPATIENT)
Dept: CARDIOLOGY | Facility: HOSPITAL | Age: 69
Discharge: HOME | End: 2024-06-19
Admitting: NURSE PRACTITIONER
Payer: MEDICARE

## 2024-06-19 DIAGNOSIS — R01.1 HEART MURMUR: ICD-10-CM

## 2024-06-19 DIAGNOSIS — R06.02 SHORTNESS OF BREATH: ICD-10-CM

## 2024-06-19 LAB
BH CV ECHO MEAS - ACS: 2.03 CM
BH CV ECHO MEAS - AO MAX PG: 5.8 MMHG
BH CV ECHO MEAS - AO MEAN PG: 3.2 MMHG
BH CV ECHO MEAS - AO ROOT DIAM: 2.9 CM
BH CV ECHO MEAS - AO V2 MAX: 120.8 CM/SEC
BH CV ECHO MEAS - AO V2 VTI: 25.5 CM
BH CV ECHO MEAS - EDV(CUBED): 52.3 ML
BH CV ECHO MEAS - EDV(MOD-SP4): 48.1 ML
BH CV ECHO MEAS - EF(MOD-SP4): 60.9 %
BH CV ECHO MEAS - EF_3D-VOL: 56 %
BH CV ECHO MEAS - ESV(CUBED): 19.9 ML
BH CV ECHO MEAS - ESV(MOD-SP4): 18.8 ML
BH CV ECHO MEAS - FS: 27.5 %
BH CV ECHO MEAS - IVS/LVPW: 0.83 CM
BH CV ECHO MEAS - IVSD: 0.82 CM
BH CV ECHO MEAS - LA DIMENSION: 3.2 CM
BH CV ECHO MEAS - LAT PEAK E' VEL: 9.1 CM/SEC
BH CV ECHO MEAS - LV DIASTOLIC VOL/BSA (35-75): 28.7 CM2
BH CV ECHO MEAS - LV MASS(C)D: 99.2 GRAMS
BH CV ECHO MEAS - LV SYSTOLIC VOL/BSA (12-30): 11.2 CM2
BH CV ECHO MEAS - LVIDD: 3.7 CM
BH CV ECHO MEAS - LVIDS: 2.7 CM
BH CV ECHO MEAS - LVPWD: 0.99 CM
BH CV ECHO MEAS - MED PEAK E' VEL: 5.8 CM/SEC
BH CV ECHO MEAS - MV A MAX VEL: 78.8 CM/SEC
BH CV ECHO MEAS - MV DEC TIME: 0.25 SEC
BH CV ECHO MEAS - MV E MAX VEL: 58.3 CM/SEC
BH CV ECHO MEAS - MV E/A: 0.74
BH CV ECHO MEAS - RVDD: 2.7 CM
BH CV ECHO MEAS - SV(MOD-SP4): 29.3 ML
BH CV ECHO MEAS - SVI(MOD-SP4): 17.5 ML/M2
BH CV ECHO MEASUREMENTS AVERAGE E/E' RATIO: 7.83
LEFT ATRIUM VOLUME INDEX: 16.8 ML/M2

## 2024-06-19 PROCEDURE — 93306 TTE W/DOPPLER COMPLETE: CPT

## 2024-06-21 ENCOUNTER — TELEPHONE (OUTPATIENT)
Dept: CARDIOLOGY | Facility: CLINIC | Age: 69
End: 2024-06-21
Payer: MEDICARE

## 2024-06-21 NOTE — TELEPHONE ENCOUNTER
Caller: Krissy Wesley    Relationship: Self    Best call back number: [phone]    What is the best time to reach you: ANYTIME    What was the call regarding: PATIENT WOULD LIKE THE RESULTS OF HER ECHO

## 2024-11-05 DIAGNOSIS — R94.31 ABNORMAL EKG: ICD-10-CM

## 2024-11-05 RX ORDER — ASPIRIN 81 MG/1
81 TABLET ORAL DAILY
Qty: 30 TABLET | Refills: 6 | Status: SHIPPED | OUTPATIENT
Start: 2024-11-05

## 2024-11-05 NOTE — TELEPHONE ENCOUNTER
Rx Refill Note  Requested Prescriptions     Pending Prescriptions Disp Refills    Aspirin Adult Low Strength 81 MG EC tablet [Pharmacy Med Name: ASPIRIN EC 81 MG TABLET] 30 tablet 6     Sig: TAKE 1 TABLET BY MOUTH DAILY      Last office visit with prescribing clinician: 6/13/2024   Last telemedicine visit with prescribing clinician: Visit date not found   Next office visit with prescribing clinician: 12/17/2024                         Would you like a call back once the refill request has been completed: [] Yes [] No    If the office needs to give you a call back, can they leave a voicemail: [] Yes [] No    Lily Leon CMA  11/05/24, 07:58 EST

## 2024-12-02 RX ORDER — DILTIAZEM HYDROCHLORIDE 180 MG/1
180 CAPSULE, COATED, EXTENDED RELEASE ORAL DAILY
Qty: 90 CAPSULE | Refills: 3 | OUTPATIENT
Start: 2024-12-02

## 2024-12-09 RX ORDER — DILTIAZEM HYDROCHLORIDE 180 MG/1
180 CAPSULE, COATED, EXTENDED RELEASE ORAL DAILY
Qty: 90 CAPSULE | Refills: 3 | OUTPATIENT
Start: 2024-12-09

## 2024-12-17 ENCOUNTER — OFFICE VISIT (OUTPATIENT)
Dept: CARDIOLOGY | Facility: CLINIC | Age: 69
End: 2024-12-17
Payer: MEDICARE

## 2024-12-17 VITALS
DIASTOLIC BLOOD PRESSURE: 70 MMHG | SYSTOLIC BLOOD PRESSURE: 130 MMHG | HEIGHT: 66 IN | WEIGHT: 135 LBS | HEART RATE: 80 BPM | BODY MASS INDEX: 21.69 KG/M2

## 2024-12-17 DIAGNOSIS — R00.2 PALPITATIONS: Primary | ICD-10-CM

## 2024-12-17 DIAGNOSIS — R06.02 SHORTNESS OF BREATH: ICD-10-CM

## 2024-12-17 DIAGNOSIS — J44.89 COPD WITH ASTHMA: ICD-10-CM

## 2024-12-17 DIAGNOSIS — I10 PRIMARY HYPERTENSION: ICD-10-CM

## 2024-12-17 PROCEDURE — 99214 OFFICE O/P EST MOD 30 MIN: CPT | Performed by: NURSE PRACTITIONER

## 2024-12-17 RX ORDER — DILTIAZEM HYDROCHLORIDE 180 MG/1
180 CAPSULE, COATED, EXTENDED RELEASE ORAL DAILY
Qty: 90 CAPSULE | Refills: 3 | Status: SHIPPED | OUTPATIENT
Start: 2024-12-17

## 2024-12-17 RX ORDER — TRIAMTERENE AND HYDROCHLOROTHIAZIDE 75; 50 MG/1; MG/1
1 TABLET ORAL DAILY
Qty: 90 TABLET | Refills: 2 | Status: SHIPPED | OUTPATIENT
Start: 2024-12-17

## 2024-12-17 RX ORDER — LISINOPRIL 5 MG/1
5 TABLET ORAL DAILY
Qty: 90 TABLET | Refills: 3 | Status: SHIPPED | OUTPATIENT
Start: 2024-12-17

## 2024-12-17 NOTE — PROGRESS NOTES
Chief Complaint   Patient presents with    Follow-up     Pt is here for cardiac follow up.   Patient has been short of breath but states that she does have asthma with COPD. Patient had ECHO done 6/13/24. Labs in chart from 3/17/22 recent labs done per pcp not in chart was done in June 2024    Med Refill     90 day refills on cardiac medications to [facility]. Medication list brought to appointment, Needs refills on cardiac medication       Cardiac Complaints  none      Subjective   Krissy Wesley is a 69 y.o. female with palpitations, HTN, and anxiety. She was referred back to our office in 2022 after not being seen for 20 years for SOA and palpitations. She was urged to undergo cardiac workup to rule out cardiac concerns. Stress was negative for ischemia, but HTN response noted, cozaar added, later changed to lisinopril. Echo showed EF stable with mild MR.  She had called in with tachycardia, BB added, later changed to cardizem due to fatigue. Echo done 2024 showed EF 60%, trace MR, and no effusion.     She comes today for follow up and admits to continued SOA. She does have COPD and associates the SOA with her medical condition. Labs were done recently in June 2024 with PCP, no current available. She is remaining active without concerns.        Cardiac History  Past Surgical History:   Procedure Laterality Date    CARDIOVASCULAR STRESS TEST  02/15/1999    R.Stress- 7 Min. 10.2 METS. 91% THR. BP- 136/92. Negative    CARDIOVASCULAR STRESS TEST  04/06/2022    3 Min.49 Sec. 4.6 METS. 75% THR. BP- 179/70. EF > 70%. Negative    ECHO - CONVERTED  04/06/2022    EF 65%. Mild MR & AI    ECHO - CONVERTED  06/19/2024    TLS. EF 60%. Trace-Mild MR & AI. No PE       Current Outpatient Medications   Medication Sig Dispense Refill    albuterol sulfate  (90 Base) MCG/ACT inhaler Inhale 2 puffs Every 6 (Six) Hours As Needed for Wheezing.      Aspirin Adult Low Strength 81 MG EC tablet TAKE 1 TABLET BY MOUTH DAILY 30  tablet 6    busPIRone (BUSPAR) 15 MG tablet Take 1 tablet by mouth 3 (Three) Times a Day.      dilTIAZem CD (Cardizem CD) 180 MG 24 hr capsule Take 1 capsule by mouth Daily. 90 capsule 3    EPINEPHrine (EPIPEN) 0.3 MG/0.3ML solution auto-injector injection 0.3 mL 1 (One) Time.      lisinopril (PRINIVIL,ZESTRIL) 5 MG tablet Take 1 tablet by mouth Daily. 90 tablet 3    montelukast (SINGULAIR) 10 MG tablet Take 1 tablet by mouth Daily.      naproxen (NAPROSYN) 500 MG tablet Take 1 tablet by mouth 2 (Two) Times a Day With Meals.      omeprazole (priLOSEC) 20 MG capsule Take 1 capsule by mouth Daily.      sertraline (ZOLOFT) 100 MG tablet Take 2 tablets by mouth Daily.      tiotropium (SPIRIVA) 18 MCG per inhalation capsule Place 1 capsule into inhaler and inhale Daily.      triamterene-hydrochlorothiazide (MAXZIDE) 75-50 MG per tablet Take 1 tablet by mouth Daily. 90 tablet 2     No current facility-administered medications for this visit.       Amoxicillin, Azithromycin, Bee venom, Sulfa antibiotics, Amoxicillin-pot clavulanate, and Codeine    Past Medical History:   Diagnosis Date    Acid reflux     Asthma     COPD (chronic obstructive pulmonary disease)     Hypertension     IBS (irritable bowel syndrome)        Social History     Socioeconomic History    Marital status:    Tobacco Use    Smoking status: Former     Current packs/day: 0.00     Average packs/day: 1 pack/day for 15.0 years (15.0 ttl pk-yrs)     Types: Cigarettes     Start date: 3/4/2008     Quit date: 3/4/2023     Years since quittin.7    Tobacco comments:     Wuit smoking 2023   Vaping Use    Vaping status: Never Used   Substance and Sexual Activity    Alcohol use: Never    Drug use: Never    Sexual activity: Never       Family History   Problem Relation Age of Onset    Hypertension Mother     Cancer Mother     Heart attack Father     Hypertension Father        Review of Systems   Constitutional: Negative for malaise/fatigue and night  "sweats.   Cardiovascular:  Negative for chest pain, claudication, dyspnea on exertion, irregular heartbeat, leg swelling, near-syncope, orthopnea, palpitations and syncope.   Respiratory:  Negative for cough, shortness of breath and wheezing.    Musculoskeletal:  Negative for back pain, joint pain and stiffness.   Gastrointestinal:  Negative for anorexia, heartburn, nausea and vomiting.   Genitourinary:  Negative for dysuria, hematuria, hesitancy and nocturia.   Neurological:  Negative for dizziness, light-headedness and loss of balance.   Psychiatric/Behavioral:  Negative for depression and memory loss. The patient is not nervous/anxious.            Objective     /70 (BP Location: Left arm, Patient Position: Sitting, Cuff Size: Adult)   Pulse 80   Ht 167.6 cm (65.98\")   Wt 61.2 kg (135 lb)   BMI 21.80 kg/m²     Constitutional:       Appearance: Not in distress.   Eyes:      Pupils: Pupils are equal, round, and reactive to light.   HENT:      Nose: Nose normal.   Pulmonary:      Effort: Pulmonary effort is normal.      Breath sounds: Normal breath sounds.   Cardiovascular:      PMI at left midclavicular line. Normal rate. Regular rhythm.      Murmurs: There is a systolic murmur.   Abdominal:      Palpations: Abdomen is soft.   Musculoskeletal: Normal range of motion.      Cervical back: Normal range of motion and neck supple. Skin:     General: Skin is warm and dry.   Neurological:      Mental Status: Alert.         Procedures         Diagnoses and all orders for this visit:    1. Palpitations (Primary)    2. Primary hypertension    3. Shortness of breath    4. COPD with asthma    Other orders  -     dilTIAZem CD (Cardizem CD) 180 MG 24 hr capsule; Take 1 capsule by mouth Daily.  Dispense: 90 capsule; Refill: 3  -     lisinopril (PRINIVIL,ZESTRIL) 5 MG tablet; Take 1 tablet by mouth Daily.  Dispense: 90 tablet; Refill: 3  -     triamterene-hydrochlorothiazide (MAXZIDE) 75-50 MG per tablet; Take 1 tablet by " mouth Daily.  Dispense: 90 tablet; Refill: 2             Palpitations: On cardizem therapy. Palpitations are denied. continue current.     HTN: BP remains stable. Will continue lisinopril, maxzide, and cardizem at same. Limited sodium urged.      SOA:  Noted today, no worse than prior. Repeat echo this summer showed no effusion. Results of echo findings discussed with patient.      COPD: On inhaler therapy. She tolerates well. No worsening SOA noted.      Tobacco abuse: Remains tobacco free!     Refills per request.      BMI noted at 21.80, good cardiac diet and walking regimen urged.      6 month follow up advised, or sooner if needed.                  Problems Addressed this Visit          Cardiac and Vasculature    Palpitations - Primary       Pulmonary and Pneumonias    COPD with asthma    Shortness of breath     Other Visit Diagnoses       Primary hypertension        Relevant Medications    dilTIAZem CD (Cardizem CD) 180 MG 24 hr capsule    lisinopril (PRINIVIL,ZESTRIL) 5 MG tablet    triamterene-hydrochlorothiazide (MAXZIDE) 75-50 MG per tablet          Diagnoses         Codes Comments    Palpitations    -  Primary ICD-10-CM: R00.2  ICD-9-CM: 785.1     Primary hypertension     ICD-10-CM: I10  ICD-9-CM: 401.9     Shortness of breath     ICD-10-CM: R06.02  ICD-9-CM: 786.05     COPD with asthma     ICD-10-CM: J44.89  ICD-9-CM: 493.20                     Electronically signed by MINO Lagunas December 17, 2024 12:05 EST

## 2025-06-30 ENCOUNTER — OFFICE VISIT (OUTPATIENT)
Dept: CARDIOLOGY | Facility: CLINIC | Age: 70
End: 2025-06-30
Payer: MEDICARE

## 2025-06-30 VITALS
RESPIRATION RATE: 16 BRPM | SYSTOLIC BLOOD PRESSURE: 114 MMHG | DIASTOLIC BLOOD PRESSURE: 78 MMHG | BODY MASS INDEX: 21.41 KG/M2 | WEIGHT: 133.2 LBS | HEART RATE: 70 BPM | OXYGEN SATURATION: 95 % | HEIGHT: 66 IN

## 2025-06-30 DIAGNOSIS — U07.0 VAPING-RELATED DISORDER: ICD-10-CM

## 2025-06-30 DIAGNOSIS — R06.02 SHORTNESS OF BREATH: ICD-10-CM

## 2025-06-30 DIAGNOSIS — I10 PRIMARY HYPERTENSION: ICD-10-CM

## 2025-06-30 DIAGNOSIS — R00.2 PALPITATIONS: Primary | ICD-10-CM

## 2025-06-30 DIAGNOSIS — J44.89 COPD WITH ASTHMA: ICD-10-CM

## 2025-06-30 PROCEDURE — 99214 OFFICE O/P EST MOD 30 MIN: CPT | Performed by: NURSE PRACTITIONER

## 2025-06-30 PROCEDURE — 93000 ELECTROCARDIOGRAM COMPLETE: CPT | Performed by: NURSE PRACTITIONER

## 2025-06-30 RX ORDER — CETIRIZINE HYDROCHLORIDE 10 MG/1
TABLET ORAL
COMMUNITY
Start: 2025-06-10

## 2025-06-30 RX ORDER — DILTIAZEM HYDROCHLORIDE 180 MG/1
180 CAPSULE, COATED, EXTENDED RELEASE ORAL DAILY
Qty: 90 CAPSULE | Refills: 3 | Status: SHIPPED | OUTPATIENT
Start: 2025-06-30

## 2025-06-30 RX ORDER — LISINOPRIL 5 MG/1
5 TABLET ORAL DAILY
Qty: 90 TABLET | Refills: 3 | Status: SHIPPED | OUTPATIENT
Start: 2025-06-30

## 2025-06-30 RX ORDER — FLUTICASONE PROPIONATE 50 MCG
SPRAY, SUSPENSION (ML) NASAL
COMMUNITY
Start: 2025-06-10

## 2025-06-30 RX ORDER — TRIAMTERENE AND HYDROCHLOROTHIAZIDE 75; 50 MG/1; MG/1
1 TABLET ORAL DAILY
Qty: 90 TABLET | Refills: 2 | Status: SHIPPED | OUTPATIENT
Start: 2025-06-30

## 2025-06-30 NOTE — PROGRESS NOTES
Chief Complaint   Patient presents with    Follow-up     Cardiac Management - Patient states that she sometimes has SOA due to Asthma    Palpitations     Patient states no recent palpitations    Med Refill     Patient needs refills - [facility] at [facility]/  Apsirin 81mg - Diltazem 180mg - Lisinopril 5mg  Would like a 90 Day Supply if possible  Patient has Medication list and reviewed.        Cardiac Complaints  dyspnea      Subjective   Krissy Wesley is a 69 y.o. female with palpitations, HTN, and anxiety. She was referred back to our office in 2022 after not being seen for 20 years for SOA and palpitations. She was urged to undergo cardiac workup to rule out cardiac concerns. Stress was negative for ischemia, but HTN response noted, cozaar added, later changed to lisinopril. Echo showed EF stable with mild MR.  She had called in with tachycardia, BB added, later changed to cardizem due to fatigue. Echo done 2024 showed EF 60%, trace MR, and no effusion.     Patient comes today for follow up and denies new concerns. She does report SOA but admits this is from underlying asthma. CP, palpitations, dizziness, and syncope denied. Labs with PCP, checked about two weeks ago, not available for review. Refills requested.             Cardiac History  Past Surgical History:   Procedure Laterality Date    CARDIOVASCULAR STRESS TEST  02/15/1999    R.Stress- 7 Min. 10.2 METS. 91% THR. BP- 136/92. Negative    CARDIOVASCULAR STRESS TEST  04/06/2022    3 Min.49 Sec. 4.6 METS. 75% THR. BP- 179/70. EF > 70%. Negative    ECHO - CONVERTED  04/06/2022    EF 65%. Mild MR & AI    ECHO - CONVERTED  06/19/2024    TLS. EF 60%. Trace-Mild MR & AI. No PE       Current Outpatient Medications   Medication Sig Dispense Refill    albuterol sulfate  (90 Base) MCG/ACT inhaler Inhale 2 puffs Every 6 (Six) Hours As Needed for Wheezing.      Aspirin Adult Low Strength 81 MG EC tablet TAKE 1 TABLET BY MOUTH DAILY 30 tablet 6    busPIRone  (BUSPAR) 15 MG tablet Take 1 tablet by mouth 3 (Three) Times a Day.      cetirizine (zyrTEC) 10 MG tablet Take 1 tablet every day by oral route, for Allergies.      Cholecalciferol 50 MCG (2000 UT) capsule Take 1 capsule every day by oral route for 90 days.      dilTIAZem CD (Cardizem CD) 180 MG 24 hr capsule Take 1 capsule by mouth Daily. 90 capsule 3    EPINEPHrine (EPIPEN) 0.3 MG/0.3ML solution auto-injector injection 0.3 mL 1 (One) Time.      fluticasone (FLONASE) 50 MCG/ACT nasal spray Spray 1 spray every day by intranasal route for 14 days, for Allergies.      lisinopril (PRINIVIL,ZESTRIL) 5 MG tablet Take 1 tablet by mouth Daily. 90 tablet 3    montelukast (SINGULAIR) 10 MG tablet Take 1 tablet by mouth Daily.      naproxen (NAPROSYN) 500 MG tablet Take 1 tablet by mouth 2 (Two) Times a Day With Meals.      omeprazole (priLOSEC) 20 MG capsule Take 1 capsule by mouth Daily.      sertraline (ZOLOFT) 100 MG tablet Take 2 tablets by mouth Daily.      tiotropium (SPIRIVA) 18 MCG per inhalation capsule Place 1 capsule into inhaler and inhale Daily.      triamterene-hydrochlorothiazide (MAXZIDE) 75-50 MG per tablet Take 1 tablet by mouth Daily. 90 tablet 2     No current facility-administered medications for this visit.       Amoxicillin, Azithromycin, Bee venom, Sulfa antibiotics, Amoxicillin-pot clavulanate, and Codeine    Past Medical History:   Diagnosis Date    Acid reflux     Asthma     COPD (chronic obstructive pulmonary disease)     Hypertension     IBS (irritable bowel syndrome)        Social History     Socioeconomic History    Marital status:    Tobacco Use    Smoking status: Former     Current packs/day: 0.00     Average packs/day: 1 pack/day for 15.0 years (15.0 ttl pk-yrs)     Types: Cigarettes     Start date: 3/4/2008     Quit date: 3/4/2023     Years since quittin.3    Tobacco comments:     Wuit smoking 2023   Vaping Use    Vaping status: Every Day    Substances: Nicotine,  "Flavoring    Devices: Disposable   Substance and Sexual Activity    Alcohol use: Never    Drug use: Never    Sexual activity: Defer       Family History   Problem Relation Age of Onset    Hypertension Mother     Cancer Mother     Heart attack Father     Hypertension Father        Review of Systems   Constitutional: Negative for malaise/fatigue and night sweats.   Cardiovascular:  Positive for dyspnea on exertion. Negative for chest pain, claudication, irregular heartbeat, leg swelling, near-syncope, orthopnea, palpitations and syncope.   Respiratory:  Positive for shortness of breath. Negative for cough and wheezing.    Musculoskeletal:  Negative for back pain, joint pain and stiffness.   Gastrointestinal:  Negative for anorexia, heartburn, nausea and vomiting.   Genitourinary:  Negative for dysuria, hematuria, hesitancy and nocturia.   Neurological:  Negative for dizziness, light-headedness and loss of balance.   Psychiatric/Behavioral:  Negative for depression and memory loss. The patient is not nervous/anxious.            Objective     /78 (BP Location: Left arm, Patient Position: Sitting, Cuff Size: Adult)   Pulse 70   Resp 16   Ht 167.6 cm (65.98\")   Wt 60.4 kg (133 lb 3.2 oz)   SpO2 95%   BMI 21.51 kg/m²     Constitutional:       Appearance: Not in distress.   Eyes:      Pupils: Pupils are equal, round, and reactive to light.   HENT:      Nose: Nose normal.   Pulmonary:      Effort: Pulmonary effort is normal.      Breath sounds: Normal breath sounds.   Cardiovascular:      PMI at left midclavicular line. Normal rate. Regular rhythm.      Murmurs: There is a systolic murmur.   Musculoskeletal: Normal range of motion.      Cervical back: Normal range of motion and neck supple. Skin:     General: Skin is warm and dry.   Neurological:      Mental Status: Alert.           ECG 12 Lead    Date/Time: 6/30/2025 9:07 AM  Performed by: Shonna Stephens APRN    Authorized by: Shonna Stephens APRN  " Comparison: compared with previous ECG from 3/14/2022  Similar to previous ECG  Rhythm: sinus rhythm  BPM: 70    Clinical impression: non-specific ECG  Comments: Normal QT               Diagnoses and all orders for this visit:    1. Palpitations (Primary)  -     ECG 12 Lead    2. Primary hypertension    3. Shortness of breath    4. COPD with asthma    5. Vaping-related disorder    Other orders  -     dilTIAZem CD (Cardizem CD) 180 MG 24 hr capsule; Take 1 capsule by mouth Daily.  Dispense: 90 capsule; Refill: 3  -     lisinopril (PRINIVIL,ZESTRIL) 5 MG tablet; Take 1 tablet by mouth Daily.  Dispense: 90 tablet; Refill: 3  -     triamterene-hydrochlorothiazide (MAXZIDE) 75-50 MG per tablet; Take 1 tablet by mouth Daily.  Dispense: 90 tablet; Refill: 2               Palpitations: On cardizem therapy. Palpitations are denied. continue current. Limit caffeine. EKG done today showed NSR with normal QT.      HTN: BP remains stable. Will continue lisinopril, maxzide, and cardizem at same. Limited sodium urged.      SOA:  Noted today, no worse than prior. Repeat echo 2024 showed no effusion.       COPD: On inhaler therapy. She tolerates well. No worsening SOA noted.      Tobacco abuse: Remains tobacco free! She is now vaping since the death of her . Condolences offered.     Refills per request.      BMI noted at 21.51, good cardiac diet and walking regimen urged.      6 month follow up advised, or sooner if needed.                      Problems Addressed this Visit          Cardiac and Vasculature    Palpitations - Primary    Relevant Orders    ECG 12 Lead       Pulmonary and Pneumonias    COPD with asthma    Relevant Medications    cetirizine (zyrTEC) 10 MG tablet    fluticasone (FLONASE) 50 MCG/ACT nasal spray    Shortness of breath     Other Visit Diagnoses         Primary hypertension        Relevant Medications    dilTIAZem CD (Cardizem CD) 180 MG 24 hr capsule    lisinopril (PRINIVIL,ZESTRIL) 5 MG tablet     triamterene-hydrochlorothiazide (MAXZIDE) 75-50 MG per tablet      Vaping-related disorder              Diagnoses         Codes Comments      Palpitations    -  Primary ICD-10-CM: R00.2  ICD-9-CM: 785.1       Primary hypertension     ICD-10-CM: I10  ICD-9-CM: 401.9       Shortness of breath     ICD-10-CM: R06.02  ICD-9-CM: 786.05       COPD with asthma     ICD-10-CM: J44.89  ICD-9-CM: 493.20       Vaping-related disorder     ICD-10-CM: U07.0  ICD-9-CM: 987.9, 506.9, E869.8             BMI is within normal parameters. No other follow-up for BMI required.                Electronically signed by Shonna Stephens, APRN June 30, 2025 09:18 EDT